# Patient Record
Sex: MALE | Race: WHITE | NOT HISPANIC OR LATINO | Employment: UNEMPLOYED | URBAN - METROPOLITAN AREA
[De-identification: names, ages, dates, MRNs, and addresses within clinical notes are randomized per-mention and may not be internally consistent; named-entity substitution may affect disease eponyms.]

---
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
## 2023-03-22 NOTE — PROGRESS NOTES
Ok to admit to [facility] per Dr Helene Van, RN  03/22/23 4934 Sternal Pad replaced on pt's TLSO.